# Patient Record
Sex: FEMALE | URBAN - METROPOLITAN AREA
[De-identification: names, ages, dates, MRNs, and addresses within clinical notes are randomized per-mention and may not be internally consistent; named-entity substitution may affect disease eponyms.]

---

## 2020-11-20 ENCOUNTER — APPOINTMENT (RX ONLY)
Dept: URBAN - METROPOLITAN AREA CLINIC 55 | Facility: CLINIC | Age: 55
Setting detail: DERMATOLOGY
End: 2020-11-20

## 2020-11-20 DIAGNOSIS — Z41.9 ENCOUNTER FOR PROCEDURE FOR PURPOSES OTHER THAN REMEDYING HEALTH STATE, UNSPECIFIED: ICD-10-CM

## 2020-11-20 PROCEDURE — ? ORDER TESTS

## 2020-11-23 ENCOUNTER — APPOINTMENT (RX ONLY)
Dept: URBAN - METROPOLITAN AREA CLINIC 55 | Facility: CLINIC | Age: 55
Setting detail: DERMATOLOGY
End: 2020-11-23

## 2020-11-23 DIAGNOSIS — Z41.9 ENCOUNTER FOR PROCEDURE FOR PURPOSES OTHER THAN REMEDYING HEALTH STATE, UNSPECIFIED: ICD-10-CM

## 2020-11-23 PROCEDURE — ? LAB REPORTS REVIEWED

## 2021-01-20 ENCOUNTER — APPOINTMENT (RX ONLY)
Dept: URBAN - METROPOLITAN AREA CLINIC 55 | Facility: CLINIC | Age: 56
Setting detail: DERMATOLOGY
End: 2021-01-20

## 2021-01-20 DIAGNOSIS — Z41.9 ENCOUNTER FOR PROCEDURE FOR PURPOSES OTHER THAN REMEDYING HEALTH STATE, UNSPECIFIED: ICD-10-CM

## 2021-01-20 PROCEDURE — ? SUBCUTANEOUS HORMONE PELLET IMPLANTATION

## 2021-01-20 ASSESSMENT — LOCATION SIMPLE DESCRIPTION DERM: LOCATION SIMPLE: LEFT BUTTOCK

## 2021-01-20 ASSESSMENT — LOCATION ZONE DERM: LOCATION ZONE: TRUNK

## 2021-01-20 ASSESSMENT — LOCATION DETAILED DESCRIPTION DERM: LOCATION DETAILED: LEFT BUTTOCK

## 2021-01-20 NOTE — PROCEDURE: SUBCUTANEOUS HORMONE PELLET IMPLANTATION
Estradiol Lot Number (Optional): [id number]
Testosterone Expiration Date (Optional): 10/8/21
Pellet Placement Text (Pellets Of Testosterone.....Xxx): and or estradiol were then placed into the trocar and slid into place under the skin, approximately 1.5 inches from the incision without difficulty. The trocar was then withdrawn and the wound was closed with steri-strips and a dressing was applied. The patient applied pressure to the wound for 4 minutes. There was less than 1cc of blood loss during the case. The patient was given a post-op instruction sheet and has been instructed to call us if she experiences any problems.
Price (Use Numbers Only, No Special Characters Or $): 261
Total Testosterone Mg (Optional): 87.5
Hide Expiration Date: No
Pre-Operative Text: The patient was placed in a lateral recumbent position. The skin 2-3 inches below the waistline in the upper outer quadrant of the buttocks was cleansed with alcohol. See diagram.
Anesthesia Volume In Cc: 8
Incision And Trocar Text: After ensuring adequate anesthesia, a 4mm stab incision was made. A 3.5mm trocar was introduced through the incision into the subcutaneous fat coursing horizontally toward the hip. The stylus was then removed.
Total Estradiol Mg (Optional): 6
Number Of Testosterone Pellets: 1
Detail Level: None
Testosterone Lot Number (Optional): [id number]
Estradiol Expiration Date (Optional): 10/10/21
Anesthesia Text (1% Lidocaine With Epinephrine (1.5cc)......): was injected subdermally 2 inches in a horizontal fashion to achieve local anesthesia.
Anesthesia Type: 1% lidocaine with epinephrine and a 1:10 solution of 8.4% sodium bicarbonate
Post-Care Instructions: I reviewed with the patient in detail post-care instructions. Patient understands to call the clinic if there is any redness, swelling or pain. A detailed post-insertion hand was reviewed and provided to the patient.

## 2021-04-14 ENCOUNTER — APPOINTMENT (RX ONLY)
Dept: URBAN - METROPOLITAN AREA CLINIC 55 | Facility: CLINIC | Age: 56
Setting detail: DERMATOLOGY
End: 2021-04-14

## 2021-04-14 DIAGNOSIS — Z41.9 ENCOUNTER FOR PROCEDURE FOR PURPOSES OTHER THAN REMEDYING HEALTH STATE, UNSPECIFIED: ICD-10-CM

## 2021-04-14 PROCEDURE — ? SUBCUTANEOUS HORMONE PELLET IMPLANTATION

## 2021-04-14 ASSESSMENT — LOCATION SIMPLE DESCRIPTION DERM: LOCATION SIMPLE: RIGHT BUTTOCK

## 2021-04-14 ASSESSMENT — LOCATION ZONE DERM: LOCATION ZONE: TRUNK

## 2021-04-14 ASSESSMENT — LOCATION DETAILED DESCRIPTION DERM: LOCATION DETAILED: RIGHT BUTTOCK

## 2021-04-14 NOTE — PROCEDURE: SUBCUTANEOUS HORMONE PELLET IMPLANTATION
Price (Use Numbers Only, No Special Characters Or $): 447
Total Estradiol Mg (Optional): 10
Anesthesia Type: 1% lidocaine with epinephrine and a 1:10 solution of 8.4% sodium bicarbonate
Number Of Testosterone Pellets: 1
Pre-Operative Text: The patient was placed in a lateral recumbent position. The skin 2-3 inches below the waistline in the upper outer quadrant of the buttocks was cleansed with alcohol. See diagram.
Testosterone Lot Number (Optional): [id number]
Total Testosterone Mg (Optional): 87.5
Testosterone Expiration Date (Optional): 12/30/2021
Detail Level: None
Estradiol Lot Number (Optional): [id number]
Anesthesia Text (1% Lidocaine With Epinephrine (1.5cc)......): was injected subdermally 2 inches in a horizontal fashion to achieve local anesthesia.
Pellet Placement Text (Pellets Of Testosterone.....Xxx): and or estradiol were then placed into the trocar and slid into place under the skin, approximately 1.5 inches from the incision without difficulty. The trocar was then withdrawn and the wound was closed with steri-strips and a dressing was applied. The patient applied pressure to the wound for 4 minutes. There was less than 1cc of blood loss during the case. The patient was given a post-op instruction sheet and has been instructed to call us if she experiences any problems.
Incision And Trocar Text: After ensuring adequate anesthesia, a 4mm stab incision was made. A 3.5mm trocar was introduced through the incision into the subcutaneous fat coursing horizontally toward the hip. The stylus was then removed.
Hide Expiration Date: No
Anesthesia Volume In Cc: 8
Estradiol Expiration Date (Optional): 1/14/2022
Post-Care Instructions: I reviewed with the patient in detail post-care instructions. Patient understands to call the clinic if there is any redness, swelling or pain. A detailed post-insertion hand was reviewed and provided to the patient.

## 2021-07-20 ENCOUNTER — APPOINTMENT (RX ONLY)
Dept: URBAN - METROPOLITAN AREA CLINIC 55 | Facility: CLINIC | Age: 56
Setting detail: DERMATOLOGY
End: 2021-07-20

## 2021-07-20 DIAGNOSIS — Z41.9 ENCOUNTER FOR PROCEDURE FOR PURPOSES OTHER THAN REMEDYING HEALTH STATE, UNSPECIFIED: ICD-10-CM

## 2021-07-20 PROCEDURE — ? SUBCUTANEOUS HORMONE PELLET IMPLANTATION

## 2021-07-20 ASSESSMENT — LOCATION ZONE DERM: LOCATION ZONE: TRUNK

## 2021-07-20 ASSESSMENT — LOCATION SIMPLE DESCRIPTION DERM: LOCATION SIMPLE: LEFT BUTTOCK

## 2021-07-20 ASSESSMENT — LOCATION DETAILED DESCRIPTION DERM: LOCATION DETAILED: LEFT BUTTOCK

## 2021-07-20 NOTE — PROCEDURE: SUBCUTANEOUS HORMONE PELLET IMPLANTATION
Number Of Estradiol Pellets: 1
Anesthesia Text (1% Lidocaine With Epinephrine (1.5cc)......): was injected subdermally 2 inches in a horizontal fashion to achieve local anesthesia.
Testosterone Expiration Date (Optional): 5/29/22
Price (Use Numbers Only, No Special Characters Or $): 049
Hide Expiration Date: No
Pre-Operative Text: The patient was placed in a lateral recumbent position. The skin 2-3 inches below the waistline in the upper outer quadrant of the buttocks was cleansed with alcohol. See diagram.
Estradiol Lot Number (Optional): [id number]
Total Testosterone Mg (Optional): 87.5
Incision And Trocar Text: After ensuring adequate anesthesia, a 4mm stab incision was made. A 3.5mm trocar was introduced through the incision into the subcutaneous fat coursing horizontally toward the hip. The stylus was then removed.
Post-Care Instructions: I reviewed with the patient in detail post-care instructions. Patient understands to call the clinic if there is any redness, swelling or pain. A detailed post-insertion hand was reviewed and provided to the patient.
Total Estradiol Mg (Optional): 6
Detail Level: None
Anesthesia Type: 1% lidocaine with epinephrine and a 1:10 solution of 8.4% sodium bicarbonate
Anesthesia Volume In Cc: 8
Testosterone Lot Number (Optional): [id number]
Estradiol Expiration Date (Optional): 4/28/22
Pellet Placement Text (Pellets Of Testosterone.....Xxx): and or estradiol were then placed into the trocar and slid into place under the skin, approximately 1.5 inches from the incision without difficulty. The trocar was then withdrawn and the wound was closed with steri-strips and a dressing was applied. The patient applied pressure to the wound for 4 minutes. There was less than 1cc of blood loss during the case. The patient was given a post-op instruction sheet and has been instructed to call us if she experiences any problems.

## 2021-09-20 ENCOUNTER — RX ONLY (OUTPATIENT)
Age: 56
Setting detail: RX ONLY
End: 2021-09-20

## 2021-09-20 RX ORDER — PROGESTERONE 200 MG/1
CAPSULE ORAL
Qty: 90 | Refills: 3 | Status: CANCELLED
Stop reason: CLARIF

## 2021-10-25 ENCOUNTER — APPOINTMENT (RX ONLY)
Dept: URBAN - METROPOLITAN AREA CLINIC 55 | Facility: CLINIC | Age: 56
Setting detail: DERMATOLOGY
End: 2021-10-25

## 2021-10-25 DIAGNOSIS — Z41.9 ENCOUNTER FOR PROCEDURE FOR PURPOSES OTHER THAN REMEDYING HEALTH STATE, UNSPECIFIED: ICD-10-CM

## 2021-10-25 PROCEDURE — ? SUBCUTANEOUS HORMONE PELLET IMPLANTATION

## 2021-10-25 ASSESSMENT — LOCATION ZONE DERM: LOCATION ZONE: TRUNK

## 2021-10-25 ASSESSMENT — LOCATION SIMPLE DESCRIPTION DERM: LOCATION SIMPLE: LEFT BUTTOCK

## 2021-10-25 ASSESSMENT — LOCATION DETAILED DESCRIPTION DERM: LOCATION DETAILED: LEFT BUTTOCK

## 2021-10-25 NOTE — PROCEDURE: SUBCUTANEOUS HORMONE PELLET IMPLANTATION
Number Of Testosterone Pellets: 1
Johana Lot Number: No
Pre-Operative Text: The patient was placed in a lateral recumbent position. The skin 2-3 inches below the waistline in the upper outer quadrant of the buttocks was cleansed with alcohol. See diagram.
Price (Use Numbers Only, No Special Characters Or $): 149
Estradiol Expiration Date (Optional): 9/4/22
Detail Level: None
Testosterone Expiration Date (Optional): 8/26/22
Pellet Placement Text (Pellets Of Testosterone.....Xxx): and or estradiol were then placed into the trocar and slid into place under the skin, approximately 1.5 inches from the incision without difficulty. The trocar was then withdrawn and the wound was closed with steri-strips and a dressing was applied. The patient applied pressure to the wound for 4 minutes. There was less than 1cc of blood loss during the case. The patient was given a post-op instruction sheet and has been instructed to call us if she experiences any problems.
Incision And Trocar Text: After ensuring adequate anesthesia, a 4mm stab incision was made. A 3.5mm trocar was introduced through the incision into the subcutaneous fat coursing horizontally toward the hip. The stylus was then removed.
Anesthesia Type: 1% lidocaine with epinephrine and a 1:10 solution of 8.4% sodium bicarbonate
Estradiol Lot Number (Optional): [id number]
Total Estradiol Mg (Optional): 6
Anesthesia Text (1% Lidocaine With Epinephrine (1.5cc)......): was injected subdermally 2 inches in a horizontal fashion to achieve local anesthesia.
Anesthesia Volume In Cc: 8
Post-Care Instructions: I reviewed with the patient in detail post-care instructions. Patient understands to call the clinic if there is any redness, swelling or pain. A detailed post-insertion hand was reviewed and provided to the patient.
Total Testosterone Mg (Optional): 87.5
Testosterone Lot Number (Optional): [id number]

## 2021-12-09 ENCOUNTER — APPOINTMENT (RX ONLY)
Dept: URBAN - METROPOLITAN AREA CLINIC 55 | Facility: CLINIC | Age: 56
Setting detail: DERMATOLOGY
End: 2021-12-09

## 2022-02-07 ENCOUNTER — APPOINTMENT (RX ONLY)
Dept: URBAN - METROPOLITAN AREA CLINIC 55 | Facility: CLINIC | Age: 57
Setting detail: DERMATOLOGY
End: 2022-02-07

## 2022-02-07 DIAGNOSIS — Z41.9 ENCOUNTER FOR PROCEDURE FOR PURPOSES OTHER THAN REMEDYING HEALTH STATE, UNSPECIFIED: ICD-10-CM

## 2022-02-07 PROCEDURE — ? SUBCUTANEOUS HORMONE PELLET IMPLANTATION

## 2022-02-07 ASSESSMENT — LOCATION ZONE DERM: LOCATION ZONE: TRUNK

## 2022-02-07 ASSESSMENT — LOCATION DETAILED DESCRIPTION DERM: LOCATION DETAILED: LEFT BUTTOCK

## 2022-02-07 ASSESSMENT — LOCATION SIMPLE DESCRIPTION DERM: LOCATION SIMPLE: LEFT BUTTOCK

## 2022-02-07 NOTE — PROCEDURE: SUBCUTANEOUS HORMONE PELLET IMPLANTATION
Estradiol Expiration Date (Optional): 9/25/2022
Price (Use Numbers Only, No Special Characters Or $): 455
Hide Expiration Date: No
Detail Level: None
Pellet Placement Text (Pellets Of Testosterone.....Xxx): and or estradiol were then placed into the trocar and slid into place under the skin, approximately 1.5 inches from the incision without difficulty. The trocar was then withdrawn and the wound was closed with steri-strips and a dressing was applied. The patient applied pressure to the wound for 4 minutes. There was less than 1cc of blood loss during the case. The patient was given a post-op instruction sheet and has been instructed to call us if she experiences any problems.
Testosterone Lot Number (Optional): [id number] [id number], [id number] [id number]
Estradiol Lot Number (Optional): [id number]
Anesthesia Type: 1% lidocaine with epinephrine and a 1:10 solution of 8.4% sodium bicarbonate
Anesthesia Text (1% Lidocaine With Epinephrine (1.5cc)......): was injected subdermally 2 inches in a horizontal fashion to achieve local anesthesia.
Anesthesia Volume In Cc: 8
Pre-Operative Text: The patient was placed in a lateral recumbent position. The skin 2-3 inches below the waistline in the upper outer quadrant of the buttocks was cleansed with alcohol. See diagram.
Number Of Estradiol Pellets: 1
Total Testosterone Mg (Optional): 75
Post-Care Instructions: I reviewed with the patient in detail post-care instructions. Patient understands to call the clinic if there is any redness, swelling or pain. A detailed post-insertion hand was reviewed and provided to the patient.
Incision And Trocar Text: After ensuring adequate anesthesia, a 4mm stab incision was made. A 3.5mm trocar was introduced through the incision into the subcutaneous fat coursing horizontally toward the hip. The stylus was then removed.
Testosterone Expiration Date (Optional): 11/22/2022, 10/20/2022
Total Estradiol Mg (Optional): 6
Number Of Testosterone Pellets: 2

## 2022-05-02 ENCOUNTER — APPOINTMENT (RX ONLY)
Dept: URBAN - METROPOLITAN AREA CLINIC 55 | Facility: CLINIC | Age: 57
Setting detail: DERMATOLOGY
End: 2022-05-02

## 2022-05-02 DIAGNOSIS — Z41.9 ENCOUNTER FOR PROCEDURE FOR PURPOSES OTHER THAN REMEDYING HEALTH STATE, UNSPECIFIED: ICD-10-CM

## 2022-05-02 PROCEDURE — ? SUBCUTANEOUS HORMONE PELLET IMPLANTATION

## 2022-05-02 ASSESSMENT — LOCATION ZONE DERM: LOCATION ZONE: TRUNK

## 2022-05-02 ASSESSMENT — LOCATION SIMPLE DESCRIPTION DERM: LOCATION SIMPLE: RIGHT BUTTOCK

## 2022-05-02 ASSESSMENT — LOCATION DETAILED DESCRIPTION DERM: LOCATION DETAILED: RIGHT BUTTOCK

## 2022-05-02 NOTE — PROCEDURE: SUBCUTANEOUS HORMONE PELLET IMPLANTATION
Estradiol Expiration Date (Optional): 12/9/2022
Hide Expiration Date: No
Anesthesia Volume In Cc: 8
Pre-Operative Text: The patient was placed in a lateral recumbent position. The skin 2-3 inches below the waistline in the upper outer quadrant of the buttocks was cleansed with alcohol. See diagram.
Total Estradiol Mg (Optional): 6
Pellet Placement Text (Pellets Of Testosterone.....Xxx): and or estradiol were then placed into the trocar and slid into place under the skin, approximately 1.5 inches from the incision without difficulty. The trocar was then withdrawn and the wound was closed with steri-strips and a dressing was applied. The patient applied pressure to the wound for 4 minutes. There was less than 1cc of blood loss during the case. The patient was given a post-op instruction sheet and has been instructed to call us if she experiences any problems.
Total Testosterone Mg (Optional): 75
Testosterone Expiration Date (Optional): 10/16/2022, 10/13/2022
Estradiol Lot Number (Optional): [id number]
Detail Level: None
Anesthesia Type: 1% lidocaine with epinephrine and a 1:10 solution of 8.4% sodium bicarbonate
Number Of Estradiol Pellets: 1
Anesthesia Text (1% Lidocaine With Epinephrine (1.5cc)......): was injected subdermally 2 inches in a horizontal fashion to achieve local anesthesia.
Incision And Trocar Text: After ensuring adequate anesthesia, a 4mm stab incision was made. A 3.5mm trocar was introduced through the incision into the subcutaneous fat coursing horizontally toward the hip. The stylus was then removed.
Testosterone Lot Number (Optional): [id number] [id number], [id number] [id number]
Number Of Testosterone Pellets: 2
Post-Care Instructions: I reviewed with the patient in detail post-care instructions. Patient understands to call the clinic if there is any redness, swelling or pain. A detailed post-insertion hand was reviewed and provided to the patient.
Price (Use Numbers Only, No Special Characters Or $): 609

## 2022-08-01 ENCOUNTER — APPOINTMENT (RX ONLY)
Dept: URBAN - METROPOLITAN AREA CLINIC 55 | Facility: CLINIC | Age: 57
Setting detail: DERMATOLOGY
End: 2022-08-01

## 2022-08-01 DIAGNOSIS — Z41.9 ENCOUNTER FOR PROCEDURE FOR PURPOSES OTHER THAN REMEDYING HEALTH STATE, UNSPECIFIED: ICD-10-CM

## 2022-08-01 PROCEDURE — ? INVENTORY

## 2022-08-01 PROCEDURE — ? SUBCUTANEOUS HORMONE PELLET IMPLANTATION

## 2022-08-01 ASSESSMENT — LOCATION ZONE DERM: LOCATION ZONE: TRUNK

## 2022-08-01 ASSESSMENT — LOCATION DETAILED DESCRIPTION DERM: LOCATION DETAILED: LEFT BUTTOCK

## 2022-08-01 ASSESSMENT — LOCATION SIMPLE DESCRIPTION DERM: LOCATION SIMPLE: LEFT BUTTOCK

## 2022-08-01 NOTE — PROCEDURE: SUBCUTANEOUS HORMONE PELLET IMPLANTATION
Anesthesia Type: 1% lidocaine with epinephrine and a 1:10 solution of 8.4% sodium bicarbonate
Anesthesia Volume In Cc: 0.5
Post-Care Instructions: I reviewed with the patient in detail post-care instructions. Patient understands to call the clinic if there is any redness, swelling or pain. A detailed post-insertion hand was reviewed and provided to the patient.
Estradiol Lot Number (Optional): [id number]
Number Of Estradiol Pellets: 1
Testosterone Expiration Date (Optional): 04/22/23; 12/09/22
Testosterone Lot Number (Optional): [id number]; [id number]
Anesthesia Text (1% Lidocaine With Epinephrine (1.5cc)......): was injected subdermally 2 inches in a horizontal fashion to achieve local anesthesia.
Detail Level: None
Number Of Testosterone Pellets: 2
Total Estradiol Mg (Optional): 6
Johana Lot Number: No
Estradiol Expiration Date (Optional): 06/01/22
Pellet Placement Text (Pellets Of Testosterone.....Xxx): were then placed into the trocar and slid into place under the skin, approximately 1.5 inches from the incision without difficulty. The trocar was then withdrawn and the wound was closed with steri-strips, unless contraindicated and a waterproof dressing was applied. The patient applied pressure to the wound . \\n The patient was given a post-op instruction sheet and has been instructed to call us if she experiences any problems.
Incision And Trocar Text: After ensuring adequate anesthesia, a 4mm stab incision was made. A 3.5mm trocar was introduced through the incision into the subcutaneous fat coursing horizontally toward the hip. The stylus was then removed.
Total Testosterone Mg (Optional): 75
Pre-Operative Text: The patient was placed in a lateral recumbent position. The skin 2-3 inches below the waistline in the upper outer quadrant of the buttocks was cleansed with hibiclens.

## 2022-10-19 ENCOUNTER — APPOINTMENT (RX ONLY)
Dept: URBAN - METROPOLITAN AREA CLINIC 55 | Facility: CLINIC | Age: 57
Setting detail: DERMATOLOGY
End: 2022-10-19

## 2022-10-19 DIAGNOSIS — Z41.9 ENCOUNTER FOR PROCEDURE FOR PURPOSES OTHER THAN REMEDYING HEALTH STATE, UNSPECIFIED: ICD-10-CM

## 2022-10-19 PROCEDURE — ? SUBCUTANEOUS HORMONE PELLET IMPLANTATION

## 2022-10-19 PROCEDURE — ? INVENTORY

## 2022-10-19 NOTE — PROCEDURE: SUBCUTANEOUS HORMONE PELLET IMPLANTATION
Hide Expiration Date: No
Estradiol Expiration Date (Optional): 09/01/23
Total Testosterone Mg (Optional): 75
Pre-Operative Text: The patient was placed in a lateral recumbent position. The skin 2-3 inches below the waistline in the upper outer quadrant of the buttocks was cleansed with hibiclens.
Incision And Trocar Text: After ensuring adequate anesthesia, a 4mm stab incision was made. A 3.5mm trocar was introduced through the incision into the subcutaneous fat coursing horizontally toward the hip. The stylus was then removed.
Testosterone Expiration Date (Optional): 08/03/23; 07/29/23
Total Estradiol Mg (Optional): 6
Anesthesia Text (1% Lidocaine With Epinephrine (1.5cc)......): was injected subdermally 2 inches in a horizontal fashion to achieve local anesthesia.
Estradiol Lot Number (Optional): [id number]
Anesthesia Volume In Cc: 0.5
Detail Level: None
Number Of Testosterone Pellets: 2
Testosterone Lot Number (Optional): [id number]; [id number]
Pellet Placement Text (Pellets Of Testosterone.....Xxx): were then placed into the trocar and slid into place under the skin, approximately 1.5 inches from the incision without difficulty. The trocar was then withdrawn and the wound was closed with steri-strips, unless contraindicated and a waterproof dressing was applied. The patient applied pressure to the wound . \\n The patient was given a post-op instruction sheet and has been instructed to call us if she experiences any problems.
Number Of Estradiol Pellets: 1
Anesthesia Type: 1% lidocaine with epinephrine and a 1:10 solution of 8.4% sodium bicarbonate
Post-Care Instructions: I reviewed with the patient in detail post-care instructions. Patient understands to call the clinic if there is any redness, swelling or pain. A detailed post-insertion hand was reviewed and provided to the patient.